# Patient Record
Sex: MALE | Race: WHITE | URBAN - METROPOLITAN AREA
[De-identification: names, ages, dates, MRNs, and addresses within clinical notes are randomized per-mention and may not be internally consistent; named-entity substitution may affect disease eponyms.]

---

## 2021-02-05 ENCOUNTER — TELEPHONE (OUTPATIENT)
Dept: SURGERY | Age: 66
End: 2021-02-05

## 2021-02-11 ENCOUNTER — PROCEDURE VISIT (OUTPATIENT)
Dept: SURGERY | Age: 66
End: 2021-02-11
Payer: COMMERCIAL

## 2021-02-11 VITALS — TEMPERATURE: 97.5 F | SYSTOLIC BLOOD PRESSURE: 125 MMHG | DIASTOLIC BLOOD PRESSURE: 90 MMHG | HEART RATE: 96 BPM

## 2021-02-11 DIAGNOSIS — C44.319 BASAL CELL CARCINOMA OF RIGHT CHEEK: ICD-10-CM

## 2021-02-11 DIAGNOSIS — C44.622 SQUAMOUS CELL CARCINOMA OF RIGHT HAND: Primary | ICD-10-CM

## 2021-02-11 PROCEDURE — 99203 OFFICE O/P NEW LOW 30 MIN: CPT | Performed by: DERMATOLOGY

## 2021-02-11 PROCEDURE — G8421 BMI NOT CALCULATED: HCPCS | Performed by: DERMATOLOGY

## 2021-02-11 PROCEDURE — G8484 FLU IMMUNIZE NO ADMIN: HCPCS | Performed by: DERMATOLOGY

## 2021-02-11 PROCEDURE — 4004F PT TOBACCO SCREEN RCVD TLK: CPT | Performed by: DERMATOLOGY

## 2021-02-11 PROCEDURE — 17311 MOHS 1 STAGE H/N/HF/G: CPT | Performed by: DERMATOLOGY

## 2021-02-11 PROCEDURE — G8427 DOCREV CUR MEDS BY ELIG CLIN: HCPCS | Performed by: DERMATOLOGY

## 2021-02-11 PROCEDURE — 12052 INTMD RPR FACE/MM 2.6-5.0 CM: CPT | Performed by: DERMATOLOGY

## 2021-02-11 PROCEDURE — 3017F COLORECTAL CA SCREEN DOC REV: CPT | Performed by: DERMATOLOGY

## 2021-02-11 RX ORDER — CETIRIZINE HYDROCHLORIDE 10 MG/1
1 TABLET ORAL DAILY
COMMUNITY
Start: 2021-01-04

## 2021-02-11 RX ORDER — BENZONATATE 100 MG/1
2 CAPSULE ORAL DAILY
COMMUNITY
Start: 2020-12-17

## 2021-02-11 RX ORDER — DIVALPROEX SODIUM 500 MG/1
1 TABLET, EXTENDED RELEASE ORAL NIGHTLY
COMMUNITY
Start: 2021-01-04

## 2021-02-11 RX ORDER — TIOTROPIUM BROMIDE INHALATION SPRAY 3.12 UG/1
2 SPRAY, METERED RESPIRATORY (INHALATION) DAILY
COMMUNITY
Start: 2021-01-04

## 2021-02-11 RX ORDER — APIXABAN 5 MG/1
1 TABLET, FILM COATED ORAL 2 TIMES DAILY
COMMUNITY
Start: 2021-01-02

## 2021-02-11 RX ORDER — LISINOPRIL 20 MG/1
1 TABLET ORAL DAILY
COMMUNITY
Start: 2021-01-12

## 2021-02-11 RX ORDER — ALBUTEROL SULFATE 90 UG/1
1 AEROSOL, METERED RESPIRATORY (INHALATION) 2 TIMES DAILY
COMMUNITY
Start: 2021-01-12

## 2021-02-11 RX ORDER — PANTOPRAZOLE SODIUM 40 MG/1
1 TABLET, DELAYED RELEASE ORAL DAILY
COMMUNITY
Start: 2021-01-04

## 2021-02-11 NOTE — PATIENT INSTRUCTIONS
[facility] Office Hours:    Monday-Thursday  7:30 AM-4:30 PM    Friday  9:00 AM-1:00 PM    POST-OPERATIVE CARE FOR DISSOLVING STICHES  Bandage change after 48 hours    During your procedure today, dissolving sutures, or stiches, were used to close the wound. You do not have to have stiches removed. They will dissolve (melt away) on their own. Please follow these instructions to help you recover from your procedure and help your wound heal.    CARING FOR YOUR SURGICAL SITE  The bandage should remain on and completely dry for 48 hours. Do NOT get the bandage wet. 1. After the first 48 hours, gently remove the remaining part of the bandage. It can be helpful to moisten the bandage edges in the shower. Steri strips may still be on the wound. It is ok, they will fall off slowly with the daily bandage changes. 2. Gently clean AROUND the wound daily with mild soap and water. Please avoid the area from getting wet. The more moisture is on the sutures the quicker they will dissolve. 3. Dry (pat) the area with a clean Q-tip or gauze. Try to clean off any debris or crust from the area. 4. Apply a layer of Vaseline/ Aquaphor (or Bacitracin if your doctor recommends) to the wound area only. 5. Cut a piece of Telfa (or any non-stick dressing) to fit just over the wound and secure it with paper tape. If the wound is small you may use a Band- Aid. Keep area covered for a total of 1 week(s). If the dressing comes off or if you have questions, or concerns about the dressing, please call the office for instructions! POST OPERATIVE INSTRUCTIONS    1. Activity: Do not lift anything heavier than a gallon of milk for 1 week. Also, avoid strenuous activity such as running, power walking or contact sports. 2. Eating and drinking: Do not drink alcohol for 48 hours after your procedure. Alcohol increases the chances of bleeding.   3. Medicines   -If you have discomfort, take Acetaminophen (Tylenol or Extra Strength Tylenol). Follow the instructions and warning on the bottle. -If your doctor has prescribed you an Aspirin daily, please keep taking it. Do not take extra Aspirin or medicines containing Aspirin (such as [place] and Excedrin) for 48 hours after your procedure. Bleeding: If bleeding occurs, DO NOT remove the bandage. Put firm pressure on the area with gauze for 20 minutes without peeking. If the bleeding continues, apply pressure for another 20 minutes. If the bleeding does not stop after you apply pressure, call us right away. If you can not call, go to the nearest emergency room or urgent care facility. What to expect:  You may have these symptoms. They are normal and should get better with time:  1. Swelling. Swelling usually increases for the first 48 hours after your procedure and then begins to improve. Some soreness and redness around your wound. If we worked close to your eyes (forehead, nose, temple, or upper cheeks) your eyes may become swollen and/ or black and blue. 2. Bruising, which could last 1 week or more. 3. Pink and bumpy appearance to the scar. This may happen a few weeks after your procedure. After 4 weeks, you may gently massage the area each day with facial moisturizer or petroleum jelly (Vaseline or Aquaphor). This will help to smooth the skin and improve the appearance of the scar. The color of your scar will fade over time, but it may be pink for several months after the procedure. The scar may take 6 months to 1 year to reach its final color and appearance. 4. \"Spitting\" suture. Occasionally, an inside suture (stitch) does not completely dissolve. When this happens, (generally 4-8 weeks after surgery), it causes a bump or \"pimple\" to form on the scar. This is easily removed and is not at all serious. It does not mean the skin cancer has returned. Contact us if it happens, but do not be alarmed. Vitamin E oil is NOT necessary.  A good moisturizer is just as effective. Sunscreen IS necessary. Use at least and SPF 30 sunscreen daily- even in winter    Call us at [phone] right away if you have any of the following symptoms:  -Bleeding that you can not stop (see highlighted area above). -Pain that lasts longer than 48 hours.  -Your wound becomes more painful, red or hot. -Bruising and swelling that does not begin to improve within the 48 hours or gets worse suddenly.

## 2021-02-11 NOTE — PROGRESS NOTES
PRE-PROCEDURE SCREENING    Pacemaker/ICD: No  Difficulty with numbing in the past: No  Local Anesthesia Reaction/passing out: No  Latex or adhesive allergy:  No  Bleeding/Clotting Disorders: No  Anticoagulant Therapy: Yes, Eliquis   Joint prosthesis: No  Artificial Heart Valve: No  Stroke or Seizures: No  Organ Transplant or Lymphoma: No  Immunosuppression: No  Respiratory Problems: Yes, COPD, uses inhalers

## 2021-02-11 NOTE — PROGRESS NOTES
MOHS PROCEDURE NOTE    PHYSICIAN:  Stephen Rebolledo MD    ASSISTANT: Oralia Alicia RN, Low Allen LPN     REFERRING PROVIDER:  GEGE Arce PA-C    PREOPERATIVE DIAGNOSIS: Nodular and infiltrative basal cell carcinoma    SPECIFIC MOHS INDICATIONS:  location and histology    AUC SCORIN/9    POSTOPERATIVE DIAGNOSIS: SAME    LOCATION: Right nasolabial fold    OPERATIVE PROCEDURE:  MOHS MICROGRAPHIC SURGERY    RECONSTRUCTION OF DEFECT: Intermediate layered closure    PREOPERATIVE SIZE: 14x9 MM    DEFECT SIZE: 21x11 MM    LENGTH OF REPAIRED WOUND/SIZE OF FLAP/SIZE OF GRAFT:  43 MM    ANESTHESIA:  10mL 1% lidocaine with epinephrine 1:100,000 buffered. EBL:  MINIMAL    DURATION OF PROCEDURE:  1 HOURS    POSTOPERATIVE OBSERVATION: 40 MINUTES    SPECIMENS:  SEE MOHS MAP    COMPLICATIONS:  NONE    DESCRIPTION OF PROCEDURE:  The patient was given a mirror, as appropriate, and the biopsy site was identified, marked with a surgical marking pen, and verified by the patient. Options for treatment were discussed and the patient was informed that Mohs surgery was the selected treatment based on its lower recurrence rate, given the features listed above, as compared to other treatment modalities such as excision, radiation, or curettage, and agreed with this treatment plan. Risks and benefits including bruising, swelling, bleeding, infection, nerve injury, recurrence, and scarring were discussed with the patient prior to the procedure and a written consent detailing these and other risks was reviewed with the patient and signed. There was a time out for person and procedure verification. The surgical site was prepped with an antiseptic solution. Application of an antiseptic solution was repeated before each surgical stage. Stage I:  The clinically-apparent tumor was carefully defined and debulked, determining the edge of the surgical excision.     A thin layer of tumor-laden tissue was excised with a narrow margin of normal-appearing skin, using the technique of Mohs. A map was prepared to correspond to the area of skin from which it was excised. Hemostasis was achieved using electrosurgery. The wound was bandaged. The tissue was prepared for the cryostat and sectioned. 1 section(s) prepared. Each section was coded, cut, and stained for microscopic examination. The entire base and margins of the excised piece of tissue were examined by the surgeon. The tissue was examined to the level of subcut fat. No tumor was identified at the peripheral margins of stage I of microscopically controlled surgery. DEFECT MANAGEMENT:    REPAIR DESCRIPTION:  Various closure modalities were discussed with the patient, and it was decided that an intermediate layered repair would best preserve normal anatomic and functional relationships. Additional risk of wound dehiscence was discussed. The area was anesthetized with 1% lidocaine with epinephrine 1:100,000 buffered, was given a sterile prep using Chlorhexidine gluconate 4% solution and draped in the usual sterile fashion. Recreation and enlargement of the wound was performed by excising cones of tissue via the triangulation technique. The final incision lines were placed with respect for the patient's natural skin tension lines in a linear configuration to avoid functional and aesthetic distortion of adjacent free margins. Following minimal undermining, meticulous hemostasis was obtained with spot monopolar electrocoagulation. Subcutaneous dead space and dermis were closed using 5-0 Vicryl buried subcutaneous interrupted suture and the epidermis was approximated with 5-0 Fast absorbing gut running epidermal sutures. WOUND COVERAGE:  The wound was cleaned with normal saline solution, dried off, Aquaphor ointment was applied, and the wound was covered. A dressing was applied for stabilization and light pressure.   The patient was given detailed oral and written instructions on postoperative care. There were no complications. The patient left the Unit in good medical condition. FOLLOW-UP:  As dissolving sutures were placed, the patient was asked to return if any questions or concerns arose, but otherwise will return to see general dermatology per their instructions.

## 2021-02-12 ENCOUNTER — TELEPHONE (OUTPATIENT)
Dept: SURGERY | Age: 66
End: 2021-02-12

## 2021-02-15 NOTE — PROGRESS NOTES
Mohs surgery and repair options were discussed with the patient and all questions were answered. The patient opted to proceed with scheduling Mohs surgery as soon as possible. The patient understands to call with any changes to his/her medical condition prior to surgery, including any rapid growth of the lesion discussed today or any change in medications. Repair options discussed in detail and illustrated for the patient either with a drawing or representative photos of similar reconstructions in other cases. I informed the patient that based on size and location the following would be the most likely options, although this could change depending on the final size and shape of the defect following cancer extirpation:    Intermediate layered closure     Risks of surgery were discussed, including but not limited to bleeding, infection, sensory nerve damage, motor nerve damage, numbness, tingling, pain, need for further procedures including scar revision surgery or other scar improvement procedures, referral to other specialties for additional procedures, cosmetic outcome that does not align with patient expectation. All questions answered in regards to the need for surgery and the possible reconstructive modalities necessary.      High risk tumor characteristics that complicate surgery or post-operative healing (patient or procedure risk factors including high risk squamous cell carcinoma and the necessity of flap or graft reconstruction):  no  Co-morbidities increasing the risk of surgical complications, mortality or morbidity: (I.e. immunosuppression, diabetes, anticoagulant therapy, pacemaker/defibrillator, prosthetic joint, tobacco use):  no    OTC Medication recommendations:  Acetaminophen and or ibuprofen for post-procedure pain if cleared by PCP and permissible with patients known co-morbidities  Patient currently on Anticoagulant or other medications that may impact surgery:  Yes eliquis - to continue  Plan for pre or post-operative Prescription medications: no    Given clinical appearance; will confirm site and Enrique Finn will also compare the photo we took today to determine best next management, either repeat clinical exam or definitive Mohs.

## 2021-02-24 ENCOUNTER — TELEPHONE (OUTPATIENT)
Dept: SURGERY | Age: 66
End: 2021-02-24

## 2021-02-24 NOTE — TELEPHONE ENCOUNTER
Jovan Mckeon called stating that if Dr. Xavier Cho thinks that Mohs is not appropriate due to the clinical chages, he was planning on bring pt back for a full body skin check and order him Efudex 8 wk. Let me know what you advise or call Charlie Escalera directly on [phone] to discuss further. Pt on for tomorrow has not been confirmed yet.

## 2021-02-24 NOTE — TELEPHONE ENCOUNTER
Think clinically the area looks like it could be addressed nonsurgically so I am inclined to agree with Po's plan for fse and efudex unless the area on the pt's hand were to grow in size or become sx in which case he should call me or Mandy Ribera [facility]

## 2021-02-24 NOTE — TELEPHONE ENCOUNTER
Sent message to [facility] assistant to let them know Dr. Shelvia Barthel thoughts. Assuming cx appt for tomorrow but they need to notify the pt of their plan.